# Patient Record
Sex: MALE | Race: WHITE | NOT HISPANIC OR LATINO | URBAN - NONMETROPOLITAN AREA
[De-identification: names, ages, dates, MRNs, and addresses within clinical notes are randomized per-mention and may not be internally consistent; named-entity substitution may affect disease eponyms.]

---

## 2022-08-29 ENCOUNTER — APPOINTMENT (RX ONLY)
Dept: URBAN - NONMETROPOLITAN AREA CLINIC 12 | Facility: CLINIC | Age: 72
Setting detail: DERMATOLOGY
End: 2022-08-29

## 2022-08-29 PROCEDURE — ? PRESCRIPTION

## 2022-08-29 PROCEDURE — ? ELECTRON BEAM THERAPY

## 2022-08-29 RX ORDER — MUPIROCIN 20 MG/G
OINTMENT TOPICAL
Qty: 22 | Refills: 0 | Status: ERX | COMMUNITY
Start: 2022-08-29

## 2022-08-29 RX ADMIN — MUPIROCIN: 20 OINTMENT TOPICAL at 00:00

## 2022-08-29 NOTE — PROCEDURE: ELECTRON BEAM THERAPY
Dimensions-Y Axis In Cm: 0
Total Planned Fractions: 1700 [facility]
Date Of Fraction 7: 08/25/2022
Date Of Fraction 8: 08/26/2022
Daily Dosage Administered: 77[address] [facility]
Assessment: Appropriate reaction
Date Of Fraction 12: 09/01/2022
Date Of Fraction 3: 08/19/2022
Send Cpt Codes To Pm?: Yes
Total Rx Dosage: [address]
Location Override (Location Will Render As Ema Body Location If Left Blank): right helix ear
Radiation Units: cGy
Date Of Fraction 4: 08/22/2022
Change Daily Dosage Administered Mid Treatment?: No
Date Of Fraction 9: 08/29/2022
Date Of Fraction 5: 08/23/2022
Ebt Cpt Code (Please Add Code Details Below): 
Date Of Fraction 1: 08/16/2022
Date Of Fraction 2: 08/18/2022
Date Of Fraction 6: 08/24/2022
Detail Level: Simple
Date Of Fraction 4: 08/04/2022
Date Of Fraction 8: 08/10/2022
Daily Dosage Administered: 250
Total Rx Dosage: [address]
Ebt Cpt Code (Please Add Code Details Below): 
Date Of Fraction 5: 08/05/2022
Location Override (Location Will Render As Ema Body Location If Left Blank): below left eye
Date Of Fraction 9: 08/11/2022
Date Of Fraction 6: 08/08/2022
Date Of Fraction 1: 08/01/2022
Date Of Fraction 10: 08/12/2022
Date Of Fraction 3: 08/03/2022
Total Planned Fractions: [address] [facility]
Date Of Fraction 7: 08/09/2022
Date Of Fraction 2: 08/02/2022
Date Of Fraction 11: 08/15/2022
Skin Reaction?: expected skin reaction
Lesion Regression?: 100
Clinical Recommendations: Skin recommendations for mild, moderate, brisk erythema or dry desquamation
How Was The Treatment Tolerated?: very well
Early, Moist Desquamation Counseling: The patient was instructed in meticulous wound care and counseled on potential and expected side effects of treatment and reasonable expectations for the time to heal. They appeared to have all of their questions answered to their satisfaction. They were recommended to cleanse the treatment site with dilute hydrogen peroxide and water (using 50:50 ratio). They were told how to apply the solution gently with a cotton ball twice daily. After cleaning, they were instructed to pat the area dry with a soft cloth and then apply a small amount of Silvadene 1% cream twice daily. They were told to return to the clinic in 7 days for re-evaluation. The patient understands to call with any questions, concerns or difficulties. They were informed of the potentital for infection and counseled on common signs and symptoms of infection including skin redness extending outside of the treatment area, enlargement or skin breakdown, significant warmth, pain, fever or chills or sweats. They voiced an understanding to contact us immediately if any of these symptoms develop. Their follow up appointment was scheduled. They were also instructed to follow-up with dermatology for skin cancer surveillance.

## 2022-08-30 ENCOUNTER — APPOINTMENT (RX ONLY)
Dept: URBAN - NONMETROPOLITAN AREA CLINIC 12 | Facility: CLINIC | Age: 72
Setting detail: DERMATOLOGY
End: 2022-08-30

## 2022-08-30 PROBLEM — C44.329 SQUAMOUS CELL CARCINOMA OF SKIN OF OTHER PARTS OF FACE: Status: ACTIVE | Noted: 2022-08-30

## 2022-08-30 PROBLEM — C44.222 SQUAMOUS CELL CARCINOMA OF SKIN OF RIGHT EAR AND EXTERNAL AURICULAR CANAL: Status: ACTIVE | Noted: 2022-08-30

## 2022-08-30 PROCEDURE — G6012 RADIATION TREATMENT DELIVERY: HCPCS

## 2022-08-30 PROCEDURE — 77427 RADIATION TX MANAGEMENT X5: CPT

## 2022-08-30 PROCEDURE — ? ELECTRON BEAM THERAPY

## 2022-08-30 NOTE — PROCEDURE: ELECTRON BEAM THERAPY
Dimensions-Y Axis In Cm: 0
Total Planned Fractions: 17[address] [facility]
Date Of Fraction 7: 08/25/2022
Date Of Fraction 8: 08/26/2022
Daily Dosage Administered: [address] [facility]
Assessment: Appropriate reaction
Date Of Fraction 12: 09/01/2022
Date Of Fraction 3: 08/19/2022
Send Cpt Codes To Pm?: Yes
Date Of Fraction 13: 09/02/2022
Total Rx Dosage: [address]
Location Override (Location Will Render As Ema Body Location If Left Blank): right helix ear
Radiation Units: cGy
Date Of Fraction 4: 08/22/2022
Change Daily Dosage Administered Mid Treatment?: No
Date Of Fraction 9: 08/29/2022
Date Of Fraction 5: 08/23/2022
Ebt Cpt Code (Please Add Code Details Below): 
Date Of Fraction 14: 09/06/2022
Date Of Fraction 10: 08/30/2022
Date Of Fraction 1: 08/16/2022
Date Of Fraction 2: 08/18/2022
Date Of Fraction 6: 08/24/2022
Detail Level: Simple
Date Of Fraction 11: 08/31/2022
Skin Reaction?: expected skin reaction
Lesion Regression?: 100
Clinical Recommendations: Skin recommendations for mild, moderate, brisk erythema or dry desquamation
How Was The Treatment Tolerated?: very well
Early, Moist Desquamation Counseling: The patient was instructed in meticulous wound care and counseled on potential and expected side effects of treatment and reasonable expectations for the time to heal. They appeared to have all of their questions answered to their satisfaction. They were recommended to cleanse the treatment site with dilute hydrogen peroxide and water (using 50:50 ratio). They were told how to apply the solution gently with a cotton ball twice daily. After cleaning, they were instructed to pat the area dry with a soft cloth and then apply a small amount of Silvadene 1% cream twice daily. They were told to return to the clinic in 7 days for re-evaluation. The patient understands to call with any questions, concerns or difficulties. They were informed of the potentital for infection and counseled on common signs and symptoms of infection including skin redness extending outside of the treatment area, enlargement or skin breakdown, significant warmth, pain, fever or chills or sweats. They voiced an understanding to contact us immediately if any of these symptoms develop. Their follow up appointment was scheduled. They were also instructed to follow-up with dermatology for skin cancer surveillance.
Date Of Fraction 4: 08/04/2022
Date Of Fraction 8: 08/10/2022
Daily Dosage Administered: 250
Total Rx Dosage: [address]
Ebt Cpt Code (Please Add Code Details Below): 
Date Of Fraction 5: 08/05/2022
Location Override (Location Will Render As Ema Body Location If Left Blank): below left eye
Date Of Fraction 9: 08/11/2022
Date Of Fraction 6: 08/08/2022
Date Of Fraction 1: 08/01/2022
Date Of Fraction 10: 08/12/2022
Date Of Fraction 3: 08/03/2022
Total Planned Fractions: 801 [facility]
Date Of Fraction 7: 08/09/2022
Date Of Fraction 2: 08/02/2022
Date Of Fraction 11: 08/15/2022
Mild, Moderate, Brisk Erythema Or Dry Desquamation Counseling: The patient was instructed in meticulous wound care and counseled on potential and expected side effects of treatment and reasonable expectations for the time to heal.  He appeared to have all of his questions answered to his satisfaction. He was recommended to rinse the treatment site with mild soap and water (recommended Dove, Neutrogena or Cetaphil). After rinsing the treatment site twice a day he is to apply Mupirocin ointment twice daily with aquaphor ointment in between uses. Aquaphor samples were provided. The patient understands to call with any questions, concerns or difficulties. He was informed of the potentital for infection and counseled on common signs and symptoms of infection including skin redness extending outside of the treatment area, enlargement or skin breakdown, significant warmth, pain, fever, chills or sweats. He voiced an understanding to contact us immediately if any of these symptoms develop. He was also instructed to follow-up with dermatology for skin cancer surveillance.

## 2022-08-31 ENCOUNTER — APPOINTMENT (RX ONLY)
Dept: URBAN - NONMETROPOLITAN AREA CLINIC 12 | Facility: CLINIC | Age: 72
Setting detail: DERMATOLOGY
End: 2022-08-31

## 2022-08-31 PROBLEM — C44.222 SQUAMOUS CELL CARCINOMA OF SKIN OF RIGHT EAR AND EXTERNAL AURICULAR CANAL: Status: ACTIVE | Noted: 2022-08-31

## 2022-08-31 PROCEDURE — ? SIMPLE SIMULATION - BLOCK CHECK

## 2022-08-31 PROCEDURE — 77280 THER RAD SIMULAJ FIELD SMPL: CPT

## 2022-08-31 NOTE — PROCEDURE: SIMPLE SIMULATION - BLOCK CHECK
Closing Statement (Will Not Render If Left Blank): Working with the physician, the therapist adjusted the machine gantry, table, and collimator and adjusted patient positioning to allow an appositional electron beam. SSD measurements were verified using the projected optical distance indicator light and room lasers. The field was positioned at 100cm SSD to the top of the bolus material. The machine parameters were recorded. The treatment light field was photographed projected onto the patient's skin. Further digital photographs were taken and will be used as a reference.
Detail Level: Simple
Custom Bolus Type: custom mixed, molded, and shaped
Bolus Thickness In Cm: O.4cm over most superficial disease. As thick as 1.5cm from bhumi to surface. Bolus may never be greater than 1.5cm.
Position: supine
Location Override (Location Will Render As Ema Body Location If Left Blank): right helix ear

## 2022-09-06 RX ORDER — MUPIROCIN 20 MG/G
OINTMENT TOPICAL
Qty: 22 | Refills: 0 | Status: ACTIVE

## 2022-09-07 ENCOUNTER — APPOINTMENT (RX ONLY)
Dept: URBAN - NONMETROPOLITAN AREA CLINIC 12 | Facility: CLINIC | Age: 72
Setting detail: DERMATOLOGY
End: 2022-09-07

## 2022-09-07 PROBLEM — C44.222 SQUAMOUS CELL CARCINOMA OF SKIN OF RIGHT EAR AND EXTERNAL AURICULAR CANAL: Status: ACTIVE | Noted: 2022-09-07

## 2022-09-07 PROBLEM — C44.329 SQUAMOUS CELL CARCINOMA OF SKIN OF OTHER PARTS OF FACE: Status: ACTIVE | Noted: 2022-09-07

## 2022-09-07 PROCEDURE — ? PRESCRIPTION

## 2022-09-07 PROCEDURE — ? INITIAL COMPLEX SIMULATION

## 2022-09-07 PROCEDURE — 77427 RADIATION TX MANAGEMENT X5: CPT

## 2022-09-07 PROCEDURE — 77290 THER RAD SIMULAJ FIELD CPLX: CPT

## 2022-09-07 PROCEDURE — G6012 RADIATION TREATMENT DELIVERY: HCPCS

## 2022-09-07 PROCEDURE — ? ELECTRON BEAM THERAPY

## 2022-09-07 NOTE — PROCEDURE: ELECTRON BEAM THERAPY
Dimensions-Y Axis In Cm: 0
Total Planned Fractions: [facility]
Date Of Fraction 7: 08/25/2022
Date Of Fraction 8: 08/26/2022
Daily Dosage Administered: [address] [facility]
Assessment: Appropriate reaction
Date Of Fraction 12: 09/01/2022
Date Of Fraction 17: 09/09/2022
Date Of Fraction 3: 08/19/2022
Send Cpt Codes To Pm?: Yes
Intro Statement (Will Not Render If Left Blank): The patient is undergoing electron beam therapy for skin cancer and presents for weekly evaluation and management. Per protocol and as documented on the flow sheet, the patient was questioned as to subjective redness, pruritus, pain, drainage, fatigue, or any other symptoms. Objectively, the radiation area was evaluated with regards to erythema, atrophy, scale, crusting, erosion, ulceration, edema, purpura, tenderness, warmth, drainage, and any other findings. The plan was extensively reviewed including the dose, and dosing schedule. The simulation and clinical setup was also reviewed as was the external and any internal shields and based on this review the appropriateness and sufficiency of treatment was determined.
Date Of Fraction 13: 09/02/2022
Total Rx Dosage: [address]
Location Override (Location Will Render As Ema Body Location If Left Blank): right helix ear
Radiation Units: cGy
Date Of Fraction 18: 09/12/2022
Date Of Fraction 4: 08/22/2022
Change Daily Dosage Administered Mid Treatment?: No
Date Of Fraction 9: 08/29/2022
Date Of Fraction 5: 08/23/2022
Ebt Intro Statement (Will Not Render If Left Blank): The risks of EBT were reviewed with the patient prior to proceeding with today's treatment. The site was confirmed by the patient and the patient was positioned and shielded as previously determined.
Ebt Cpt Code (Please Add Code Details Below): 
Date Of Fraction 14: 09/06/2022
Date Of Fraction 10: 08/30/2022
Date Of Fraction 1: 08/16/2022
Date Of Fraction 19: 09/13/2022
Date Of Fraction 2: 08/18/2022
Date Of Fraction 15: 09/07/2022
Date Of Fraction 6: 08/24/2022
Date Of Fraction 16: 09/08/2022
Detail Level: Simple
Date Of Fraction 11: 08/31/2022
Clinical Recommendations: Skin recommendations for mild, moderate, brisk erythema or dry desquamation
Lesion Regression?: 100
Early, Moist Desquamation Counseling: The patient was instructed in meticulous wound care and counseled on potential and expected side effects of treatment and reasonable expectations for the time to heal. They appeared to have all of their questions answered to their satisfaction. They were recommended to cleanse the treatment site with dilute hydrogen peroxide and water (using 50:50 ratio). They were told how to apply the solution gently with a cotton ball twice daily. After cleaning, they were instructed to pat the area dry with a soft cloth and then apply a small amount of Silvadene 1% cream twice daily. They were told to return to the clinic in 7 days for re-evaluation. The patient understands to call with any questions, concerns or difficulties. They were informed of the potentital for infection and counseled on common signs and symptoms of infection including skin redness extending outside of the treatment area, enlargement or skin breakdown, significant warmth, pain, fever or chills or sweats. They voiced an understanding to contact us immediately if any of these symptoms develop. Their follow up appointment was scheduled. They were also instructed to follow-up with dermatology for skin cancer surveillance.
How Was The Treatment Tolerated?: very well
Skin Reaction?: expected skin reaction
Ebt Cpt Code (Please Add Code Details Below): 
Total Rx Dosage: [address]
Intro Statement (Will Not Render If Left Blank): The patient is undergoing electron beam therapy for skin cancer and presents for weekly evaluation and management. They are fully aware of risks and side effects and these have been discussed in detail. They know there are no guarantees regarding outcome. They demonstrated comprehension regarding the above.
Total Planned Fractions: [address] [facility]
Mild, Moderate, Brisk Erythema Or Dry Desquamation Counseling: The patient was instructed in meticulous wound care and counseled on potential and expected side effects of treatment and reasonable expectations for the time to heal. They appeared to have all of their questions answered to their satisfaction. They were recommended to rinse the treatment site with mild soap and water (recommended Dove, Neutrogena or Cetaphil). After rinsing the treatment site twice a day they are to apply a pea-sized amount of Aquaphor healing ointment twice daily. Aquaphor samples were provided. The patient understands to call with any questions, concerns or difficulties. They were informed of the potentital for infection and counseled on common signs and symptoms of infection including skin redness extending outside of the treatment area, enlargement or skin breakdown, significant warmth, pain, fever or chills or sweats. They voiced an understanding to contact us immediately if any of these symptoms develop. Their follow up appointment was scheduled. They were also instructed to follow-up with dermatology for skin cancer surveillance.
Daily Dosage Administered: 250
Location Override (Location Will Render As Ema Body Location If Left Blank): [address]

## 2022-09-07 NOTE — PROCEDURE: INITIAL COMPLEX SIMULATION
Custom Bolus Type: custom mixed, molded, and shaped
Energy In Mev: 6 MeV
Use Custom Eyeshields: Yes
Cummulative Dose (Include Units): 5500 cGy
Closing Statement (Will Not Render If Left Blank): The patient tolerated the simulation well and has had all of their questions answered to their satisfaction. The patient will return for field verification, simple simulation before radiation is delivered to confirm patient positioning and block shaping and positioning.
Isodose: [address]
Position: supine
Pathology: Use Selected EMA Impression
Detail Level: Simple
Send Clinical Treatment Planning Code To Pm?: No - Documentation Only
Intro Statement (Will Not Render If Left Blank): I then designed a radiation field to include the gross lesion (GTV) with additional margin that accounted for both potential subclinical microscopic extension (CTV), as well as for the beam characteristics of superficial electrons (PTV). Care was taken in designing the field near adjacent normal tissue structures. The target volume was drawn on the skin surface with a permanent marker and then the design with reference marks was carefully traced onto an acetate template. Digital photographs were taken.
Treatment Length (Include Units): 5 weeks
Location Override (Location Will Render As Ema Body Location If Left Blank): right temple
Dosing Schedule: 5 days a week
Bolus Thickness In Cm: 0.5
Eye Shield Statement (Will Not Render If Left Blank): External eye shields will be placed daily to limit scatter dose to the lenses of the eyes. Due to the COVID-19 pandemic and the risk to our patients, customized eye shielding is deamed safer than reusable eye shielding. We therefore will custom design and fabricate bilateral eye shields made of shaped lead and covered by wax, for each of our patients. These will be used only during the course of treatment and then destroyed and constitute a complex fabrication process and device.
Acetate Template Statement (Will Not Render If Left Blank): An acetate template will be used to fabricate a custom lead shielding device to allow for lead on skin collimation. This type of shielding will best limit beam penumbra. Additional shielding will also be added to protect adjacent strutures.
Daily Dose (Include Units): 250 cGy

## 2022-09-12 ENCOUNTER — APPOINTMENT (RX ONLY)
Dept: URBAN - NONMETROPOLITAN AREA CLINIC 12 | Facility: CLINIC | Age: 72
Setting detail: DERMATOLOGY
End: 2022-09-12

## 2022-09-12 PROBLEM — C44.329 SQUAMOUS CELL CARCINOMA OF SKIN OF OTHER PARTS OF FACE: Status: ACTIVE | Noted: 2022-09-12

## 2022-09-12 PROCEDURE — 77280 THER RAD SIMULAJ FIELD SMPL: CPT

## 2022-09-12 PROCEDURE — ? SIMPLE SIMULATION - BLOCK CHECK

## 2022-09-12 NOTE — PROCEDURE: SIMPLE SIMULATION - BLOCK CHECK
Location Override (Location Will Render As Ema Body Location If Left Blank): Right [place]
Bolus Thickness In Cm: O.4cm over most superficial disease. As thick as 1.5cm from bhumi to surface. Bolus may never be greater than 1.5cm.
Position: supine
Closing Statement (Will Not Render If Left Blank): Working with the physician, the therapist adjusted the machine gantry, table, and collimator and adjusted patient positioning to allow an appositional electron beam. SSD measurements were verified using the projected optical distance indicator light and room lasers. The field was positioned at 100cm SSD to the top of the bolus material. The machine parameters were recorded. The treatment light field was photographed projected onto the patient's skin. Further digital photographs were taken and will be used as a reference.
Detail Level: Simple
Custom Bolus Type: custom mixed, molded, and shaped

## 2022-09-14 ENCOUNTER — APPOINTMENT (RX ONLY)
Dept: URBAN - NONMETROPOLITAN AREA CLINIC 12 | Facility: CLINIC | Age: 72
Setting detail: DERMATOLOGY
End: 2022-09-14

## 2022-09-14 PROBLEM — C44.329 SQUAMOUS CELL CARCINOMA OF SKIN OF OTHER PARTS OF FACE: Status: ACTIVE | Noted: 2022-09-14

## 2022-09-14 PROBLEM — C44.222 SQUAMOUS CELL CARCINOMA OF SKIN OF RIGHT EAR AND EXTERNAL AURICULAR CANAL: Status: ACTIVE | Noted: 2022-09-14

## 2022-09-14 PROCEDURE — 77427 RADIATION TX MANAGEMENT X5: CPT

## 2022-09-14 PROCEDURE — G6012 RADIATION TREATMENT DELIVERY: HCPCS

## 2022-09-14 PROCEDURE — ? ELECTRON BEAM THERAPY

## 2022-09-16 ENCOUNTER — RX ONLY (OUTPATIENT)
Age: 72
Setting detail: RX ONLY
End: 2022-09-16

## 2022-09-16 RX ORDER — DOXYCYCLINE 100 MG/1
1 TABLET, FILM COATED ORAL BID
Qty: 20 | Refills: 0 | Status: ERX | COMMUNITY
Start: 2022-09-16

## 2022-09-16 RX ORDER — DOXYCYCLINE 100 MG/1
1 TABLET, FILM COATED ORAL BID
Qty: 20 | Refills: 0 | Status: CANCELLED
Stop reason: CLARIF

## 2022-09-21 ENCOUNTER — APPOINTMENT (RX ONLY)
Dept: URBAN - NONMETROPOLITAN AREA CLINIC 12 | Facility: CLINIC | Age: 72
Setting detail: DERMATOLOGY
End: 2022-09-21

## 2022-09-21 PROBLEM — C44.329 SQUAMOUS CELL CARCINOMA OF SKIN OF OTHER PARTS OF FACE: Status: ACTIVE | Noted: 2022-09-21

## 2022-09-21 PROBLEM — C44.222 SQUAMOUS CELL CARCINOMA OF SKIN OF RIGHT EAR AND EXTERNAL AURICULAR CANAL: Status: ACTIVE | Noted: 2022-09-21

## 2022-09-21 PROCEDURE — ? COMPLEX SIMULATION - REDUCED FIELD

## 2022-09-21 PROCEDURE — G6012 RADIATION TREATMENT DELIVERY: HCPCS

## 2022-09-21 PROCEDURE — 77290 THER RAD SIMULAJ FIELD CPLX: CPT

## 2022-09-21 PROCEDURE — 77427 RADIATION TX MANAGEMENT X5: CPT

## 2022-09-21 PROCEDURE — ? ELECTRON BEAM THERAPY

## 2022-09-21 NOTE — PROCEDURE: COMPLEX SIMULATION - REDUCED FIELD
Isodose: [address]
Bolus Thickness In Cm: 0.6
Closing Statement (Will Not Render If Left Blank): The patient tolerated the complex electron beam simulation well and will continue on radiotherapy using the modified field. The patient will return for a field verification simulation before radiation is delivered to confirm patient positioning and block fabrication parameters.
Detail Level: Simple
Pathology: Use Selected EMA Impression
Custom Bolus Type: custom mixed, molded, and shaped
Intro Statement (Will Not Render If Left Blank): A new radiation electron beam field was designed to include the gross lesion (GTV) with additional margin that accounted for both potential subclinical microscopic extension (CTV), as well as for the beam characteristics of superficial electrons (PTV). This field took into account the changes in the volume of the tumor that have occurred during radiation therapy. Care was taken in designing the field near adjacent normal tissue structures. The target volume was drawn on the skin surface with a permanent marker and then the design with reference marks was carefully traced onto an acetate template. Digital photographs were taken.
Acetate Template Statement (Will Not Render If Left Blank): The acetate template will be used to fabricate a custom lead on skin shielding device to allow for lead on skin collimation. This type of shielding will best limit beam penumbra and define the field.
Energy In Mev: 6
Position: supine

## 2022-09-21 NOTE — PROCEDURE: ELECTRON BEAM THERAPY
Total Rx Dosage: [address]
Daily Dosage Administered: [id number] [facility]
Date Of Fraction 8: 09/22/2022
Diagosis Override (Diagnosis Will Render As Diagnosis Name If Left Blank): Right [place]
Dimensions-Y Axis In Cm: 0
Date Of Fraction 3: 09/15/2022
Send Cpt Codes To Pm?: Yes
Change Daily Dosage Administered Mid Treatment?: No
Date Of Fraction 9: 09/23/2022
Intro Statement (Will Not Render If Left Blank): The patient is undergoing electron beam therapy for skin cancer and presents for weekly evaluation and management. Per protocol and as documented on the flow sheet, the patient was questioned as to subjective redness, pruritus, pain, drainage, fatigue, or any other symptoms. Objectively, the radiation area was evaluated with regards to erythema, atrophy, scale, crusting, erosion, ulceration, edema, purpura, tenderness, warmth, drainage, and any other findings. The plan was extensively reviewed including the dose, and dosing schedule. The simulation and clinical setup was also reviewed as was the external and any internal shields and based on this review the appropriateness and sufficiency of treatment was determined.
Assessment: Appropriate reaction
Radiation Units: cGy
Date Of Fraction 5: 09/19/2022
Date Of Fraction 4: 09/16/2022
Date Of Fraction 1: 09/13/2022
Detail Level: Simple
Mild, Moderate, Brisk Erythema Or Dry Desquamation Counseling: The patient was instructed in meticulous wound care and counseled on potential and expected side effects of treatment and reasonable expectations for the time to heal. They appeared to have all of their questions answered to their satisfaction. They were recommended to rinse the treatment site with mild soap and water (recommended Dove, Neutrogena or Cetaphil). After rinsing the treatment site twice a day they are to apply a pea-sized amount of Aquaphor healing ointment twice daily. Aquaphor samples were provided. The patient understands to call with any questions, concerns or difficulties. They were informed of the potentital for infection and counseled on common signs and symptoms of infection including skin redness extending outside of the treatment area, enlargement or skin breakdown, significant warmth, pain, fever or chills or sweats. They voiced an understanding to contact us immediately if any of these symptoms develop. Their follow up appointment was scheduled. They were also instructed to follow-up with dermatology for skin cancer surveillance.
Ebt Cpt Code (Please Add Code Details Below): 
Ebt Intro Statement (Will Not Render If Left Blank): The risks of EBT were reviewed with the patient prior to proceeding with today's treatment. The site was confirmed by the patient and the patient was positioned and shielded as previously determined.
Date Of Fraction 10: 09/26/2022
Early, Moist Desquamation Counseling: The patient was instructed in meticulous wound care and counseled on potential and expected side effects of treatment and reasonable expectations for the time to heal. They appeared to have all of their questions answered to their satisfaction. They were recommended to cleanse the treatment site with dilute hydrogen peroxide and water (using 50:50 ratio). They were told how to apply the solution gently with a cotton ball twice daily. After cleaning, they were instructed to pat the area dry with a soft cloth and then apply a small amount of Silvadene 1% cream twice daily. They were told to return to the clinic in 7 days for re-evaluation. The patient understands to call with any questions, concerns or difficulties. They were informed of the potentital for infection and counseled on common signs and symptoms of infection including skin redness extending outside of the treatment area, enlargement or skin breakdown, significant warmth, pain, fever or chills or sweats. They voiced an understanding to contact us immediately if any of these symptoms develop. Their follow up appointment was scheduled. They were also instructed to follow-up with dermatology for skin cancer surveillance.
Total Planned Fractions: [address] [facility]
Date Of Fraction 7: 09/21/2022
Date Of Fraction 6: 09/20/2022
Date Of Fraction 2: 09/14/2022
Total Planned Fractions: [address] [facility]
Date Of Fraction 7: 08/25/2022
Date Of Fraction 8: 08/26/2022
Daily Dosage Administered: 250
Date Of Fraction 12: 09/01/2022
Date Of Fraction 17: 09/09/2022
Date Of Fraction 3: 08/19/2022
Intro Statement (Will Not Render If Left Blank): The patient is undergoing electron beam therapy for skin cancer and presents for weekly evaluation and management.  Per protocol and as documented on the flow sheet, the patient was questioned as to subjective redness, pruritus, pain, drainage, fatigue, or any other symptoms.  Objectively, the radiation area was evaluated with regards to erythema, atrophy, scale, crusting, erosion, ulceration, edema, purpura, tenderness, warmth, drainage, and any other findings.  The plan was extensively reviewed including the dose, and dosing schedule.  The simulation and clinical setup was also reviewed as was the external and any internal shields and based on this review the appropriateness and sufficiency of treatment was determined.
Date Of Fraction 13: 09/02/2022
Total Rx Dosage: [address]
Location Override (Location Will Render As Ema Body Location If Left Blank): right helix ear
Date Of Fraction 18: 09/12/2022
Date Of Fraction 4: 08/22/2022
Date Of Fraction 9: 08/29/2022
Date Of Fraction 5: 08/23/2022
Ebt Cpt Code (Please Add Code Details Below): 
Date Of Fraction 14: 09/06/2022
Date Of Fraction 10: 08/30/2022
Date Of Fraction 1: 08/16/2022
Date Of Fraction 2: 08/18/2022
Date Of Fraction 15: 09/07/2022
Date Of Fraction 6: 08/24/2022
Date Of Fraction 16: 09/08/2022
Date Of Fraction 11: 08/31/2022
Clinical Recommendations: Skin recommendations for mild, moderate, brisk erythema or dry desquamation
Lesion Regression?: 100
Mild, Moderate, Brisk Erythema Or Dry Desquamation Counseling: The patient was instructed in meticulous wound care and counseled on potential and expected side effects of treatment and reasonable expectations for the time to heal.  He appeared to have all of his questions answered to his satisfaction. He was recommended to rinse the treatment site with mild soap and water (recommended Dove, Neutrogena or Cetaphil). After rinsing the treatment site twice a day he is to apply a pea-sized amount of Aquaphor healing ointment twice daily. Aquaphor samples were provided. The patient understands to call with any questions, concerns or difficulties. He wasinformed of the potentital for infection and counseled on common signs and symptoms of infection including skin redness extending outside of the treatment area, enlargement or skin breakdown, significant warmth, pain, fever or chills or sweats. He voiced an understanding to contact us immediately if any of these symptoms develop. His follow up appointment will be in conjunction with his current RT on another site. He was also instructed to follow-up with dermatology for skin cancer surveillance.
How Was The Treatment Tolerated?: very well
Skin Reaction?: expected skin reaction

## 2022-11-28 NOTE — PROCEDURE: ELECTRON BEAM THERAPY
Total Rx Dosage: [address]
Daily Dosage Administered: [id number] [facility]
Diagosis Override (Diagnosis Will Render As Diagnosis Name If Left Blank): Right [place]
Dimensions-Y Axis In Cm: 0
Date Of Fraction 3: 09/16/2022
Send Cpt Codes To Pm?: Yes
Change Daily Dosage Administered Mid Treatment?: No
Intro Statement (Will Not Render If Left Blank): The patient is undergoing electron beam therapy for skin cancer and presents for weekly evaluation and management. Per protocol and as documented on the flow sheet, the patient was questioned as to subjective redness, pruritus, pain, drainage, fatigue, or any other symptoms. Objectively, the radiation area was evaluated with regards to erythema, atrophy, scale, crusting, erosion, ulceration, edema, purpura, tenderness, warmth, drainage, and any other findings. The plan was extensively reviewed including the dose, and dosing schedule. The simulation and clinical setup was also reviewed as was the external and any internal shields and based on this review the appropriateness and sufficiency of treatment was determined.
Assessment: Appropriate reaction
Radiation Units: cGy
Date Of Fraction 5: 09/20/2022
Date Of Fraction 4: 09/19/2022
Date Of Fraction 1: 09/14/2022
Detail Level: Simple
Mild, Moderate, Brisk Erythema Or Dry Desquamation Counseling: The patient was instructed in meticulous wound care and counseled on potential and expected side effects of treatment and reasonable expectations for the time to heal. They appeared to have all of their questions answered to their satisfaction. They were recommended to rinse the treatment site with mild soap and water (recommended Dove, Neutrogena or Cetaphil). After rinsing the treatment site twice a day they are to apply a pea-sized amount of Aquaphor healing ointment twice daily. Aquaphor samples were provided. The patient understands to call with any questions, concerns or difficulties. They were informed of the potentital for infection and counseled on common signs and symptoms of infection including skin redness extending outside of the treatment area, enlargement or skin breakdown, significant warmth, pain, fever or chills or sweats. They voiced an understanding to contact us immediately if any of these symptoms develop. Their follow up appointment was scheduled. They were also instructed to follow-up with dermatology for skin cancer surveillance.
Ebt Cpt Code (Please Add Code Details Below): 
Ebt Intro Statement (Will Not Render If Left Blank): The risks of EBT were reviewed with the patient prior to proceeding with today's treatment. The site was confirmed by the patient and the patient was positioned and shielded as previously determined.
Early, Moist Desquamation Counseling: The patient was instructed in meticulous wound care and counseled on potential and expected side effects of treatment and reasonable expectations for the time to heal. They appeared to have all of their questions answered to their satisfaction. They were recommended to cleanse the treatment site with dilute hydrogen peroxide and water (using 50:50 ratio). They were told how to apply the solution gently with a cotton ball twice daily. After cleaning, they were instructed to pat the area dry with a soft cloth and then apply a small amount of Silvadene 1% cream twice daily. They were told to return to the clinic in 7 days for re-evaluation. The patient understands to call with any questions, concerns or difficulties. They were informed of the potentital for infection and counseled on common signs and symptoms of infection including skin redness extending outside of the treatment area, enlargement or skin breakdown, significant warmth, pain, fever or chills or sweats. They voiced an understanding to contact us immediately if any of these symptoms develop. Their follow up appointment was scheduled. They were also instructed to follow-up with dermatology for skin cancer surveillance.
Total Planned Fractions: [address] [facility]
Date Of Fraction 2: 09/15/2022
Total Planned Fractions: [address] [facility]
Date Of Fraction 7: 08/25/2022
Date Of Fraction 8: 08/26/2022
Daily Dosage Administered: 250
Date Of Fraction 12: 09/01/2022
Date Of Fraction 17: 09/09/2022
Date Of Fraction 3: 08/19/2022
Intro Statement (Will Not Render If Left Blank): The patient is undergoing electron beam therapy for skin cancer and presents for weekly evaluation and management.  Per protocol and as documented on the flow sheet, the patient was questioned as to subjective redness, pruritus, pain, drainage, fatigue, or any other symptoms.  Objectively, the radiation area was evaluated with regards to erythema, atrophy, scale, crusting, erosion, ulceration, edema, purpura, tenderness, warmth, drainage, and any other findings.  The plan was extensively reviewed including the dose, and dosing schedule.  The simulation and clinical setup was also reviewed as was the external and any internal shields and based on this review the appropriateness and sufficiency of treatment was determined.
Date Of Fraction 13: 09/02/2022
Total Rx Dosage: [address]
Location Override (Location Will Render As Ema Body Location If Left Blank): right helix ear
Date Of Fraction 18: 09/12/2022
Date Of Fraction 4: 08/22/2022
Date Of Fraction 9: 08/29/2022
Date Of Fraction 5: 08/23/2022
Ebt Cpt Code (Please Add Code Details Below): 
Date Of Fraction 14: 09/06/2022
Date Of Fraction 10: 08/30/2022
Date Of Fraction 1: 08/16/2022
Date Of Fraction 19: 09/13/2022
Date Of Fraction 2: 08/18/2022
Date Of Fraction 15: 09/07/2022
Date Of Fraction 6: 08/24/2022
Date Of Fraction 16: 09/08/2022
Date Of Fraction 11: 08/31/2022
Mild, Moderate, Brisk Erythema Or Dry Desquamation Counseling: The patient was instructed in meticulous wound care and counseled on potential and expected side effects of treatment and reasonable expectations for the time to heal.  He appeared to have all of his questions answered to his satisfaction. He was recommended to rinse the treatment site with mild soap and water (recommended Dove, Neutrogena or Cetaphil). After rinsing the treatment site twice a day he is to apply a pea-sized amount of Aquaphor healing ointment twice daily. Aquaphor samples were provided. The patient understands to call with any questions, concerns or difficulties. He wasinformed of the potentital for infection and counseled on common signs and symptoms of infection including skin redness extending outside of the treatment area, enlargement or skin breakdown, significant warmth, pain, fever or chills or sweats. He voiced an understanding to contact us immediately if any of these symptoms develop. His follow up appointment will be in conjunction with his current RT on another site. He was also instructed to follow-up with dermatology for skin cancer surveillance.
done